# Patient Record
Sex: FEMALE | Race: WHITE | Employment: UNEMPLOYED | URBAN - NONMETROPOLITAN AREA
[De-identification: names, ages, dates, MRNs, and addresses within clinical notes are randomized per-mention and may not be internally consistent; named-entity substitution may affect disease eponyms.]

---

## 2022-09-29 ENCOUNTER — OFFICE VISIT (OUTPATIENT)
Dept: FAMILY MEDICINE CLINIC | Age: 42
End: 2022-09-29

## 2022-09-29 ENCOUNTER — OFFICE VISIT (OUTPATIENT)
Dept: ORTHOPEDIC SURGERY | Age: 42
End: 2022-09-29

## 2022-09-29 ENCOUNTER — TELEPHONE (OUTPATIENT)
Dept: ORTHOPEDIC SURGERY | Age: 42
End: 2022-09-29

## 2022-09-29 VITALS
TEMPERATURE: 98.1 F | RESPIRATION RATE: 18 BRPM | HEART RATE: 84 BPM | DIASTOLIC BLOOD PRESSURE: 86 MMHG | WEIGHT: 202 LBS | SYSTOLIC BLOOD PRESSURE: 134 MMHG | OXYGEN SATURATION: 100 %

## 2022-09-29 VITALS — WEIGHT: 202 LBS | BODY MASS INDEX: 32.47 KG/M2 | HEIGHT: 66 IN

## 2022-09-29 DIAGNOSIS — R20.2 PARESTHESIA OF RIGHT UPPER EXTREMITY: ICD-10-CM

## 2022-09-29 DIAGNOSIS — M25.531 RIGHT WRIST PAIN: ICD-10-CM

## 2022-09-29 DIAGNOSIS — G56.31 RADIAL TUNNEL SYNDROME, RIGHT: Primary | ICD-10-CM

## 2022-09-29 DIAGNOSIS — G89.29 CHRONIC PAIN OF RIGHT WRIST: Primary | ICD-10-CM

## 2022-09-29 DIAGNOSIS — M25.531 CHRONIC PAIN OF RIGHT WRIST: Primary | ICD-10-CM

## 2022-09-29 PROCEDURE — 99203 OFFICE O/P NEW LOW 30 MIN: CPT | Performed by: PHYSICIAN ASSISTANT

## 2022-09-29 PROCEDURE — 99212 OFFICE O/P EST SF 10 MIN: CPT | Performed by: EMERGENCY MEDICINE

## 2022-09-29 RX ORDER — METHYLPREDNISOLONE 4 MG/1
TABLET ORAL
Qty: 1 KIT | Refills: 0 | Status: SHIPPED | OUTPATIENT
Start: 2022-09-29

## 2022-09-29 ASSESSMENT — ENCOUNTER SYMPTOMS
EYE PAIN: 0
VOMITING: 0
ABDOMINAL DISTENTION: 0
WHEEZING: 0
SHORTNESS OF BREATH: 0
BACK PAIN: 0
EYE DISCHARGE: 0
COUGH: 0
SINUS PRESSURE: 0
DIARRHEA: 0
EYE REDNESS: 0
NAUSEA: 0
SORE THROAT: 0

## 2022-09-29 NOTE — TELEPHONE ENCOUNTER
Radiology is recommending MRI of the forearm to better assess a lucency they see in the proximal forearm. I notified pt and ordered MRI of the radius/ulna. Pt will be contacted by central scheduling to schedule.

## 2022-09-29 NOTE — PROGRESS NOTES
Chief Complaint:   Wrist Pain      History of Present Illness   HPI:  Alex Pizarro is a 43 y.o. female who presents to [facility] today for right wrist pain and RUE tingling for over 1 year; using OTC wrist splint; symptoms progressively worsening; was peeling vegetables and think things are worse as ever. She is concerned about her self pay status and has not sought medical care for this problem which is progressively worsening. Prior to Visit Medications    Not on File       Review of Systems   Review of Systems   Constitutional:  Negative for chills and fever. HENT:  Negative for ear pain, sinus pressure and sore throat. Eyes:  Negative for pain, discharge and redness. Respiratory:  Negative for cough, shortness of breath and wheezing. Cardiovascular:  Negative for chest pain. Gastrointestinal:  Negative for abdominal distention, diarrhea, nausea and vomiting. Genitourinary:  Negative for dysuria and frequency. Musculoskeletal:  Positive for arthralgias and joint swelling. Negative for back pain. Skin:  Negative for rash and wound. Neurological:  Positive for numbness. Negative for weakness and headaches. Hematological:  Negative for adenopathy. Psychiatric/Behavioral: Negative. All other systems reviewed and are negative. Patient's medical, social, and family history reviewed    Past Medical History:  has no past medical history on file. Past Surgical History:  has no past surgical history on file. Social History:  reports that she has been smoking cigarettes. She has never used smokeless tobacco.  Family History: family history is not on file. Allergies: Patient has no known allergies. Physical Exam   Vital Signs:  /86   Pulse 84   Temp 98.1 °F (36.7 °C) (Temporal)   Resp 18   Wt 202 lb (91.6 kg)   SpO2 100%    Oxygen Saturation Interpretation: Normal.    Physical Exam  Vitals and nursing note reviewed.    Constitutional:       Appearance: She is well-developed. HENT:      Head: Normocephalic and atraumatic. Right Ear: Hearing and external ear normal.      Left Ear: Hearing and external ear normal.      Nose: Nose normal.      Mouth/Throat:      Pharynx: Uvula midline. Eyes:      General: Lids are normal.      Conjunctiva/sclera: Conjunctivae normal.      Pupils: Pupils are equal, round, and reactive to light. Cardiovascular:      Rate and Rhythm: Normal rate and regular rhythm. Heart sounds: Normal heart sounds. No murmur heard. Pulmonary:      Effort: Pulmonary effort is normal.      Breath sounds: Normal breath sounds. Abdominal:      General: Bowel sounds are normal.      Palpations: Abdomen is soft. Abdomen is not rigid. Tenderness: There is no abdominal tenderness. There is no guarding or rebound. Musculoskeletal:      Right wrist: Effusion and tenderness present. Decreased range of motion. Cervical back: Normal range of motion and neck supple. Comments: Right wrist:  Borderline Tinnel's/ Positive Phalen's sign   Skin:     General: Skin is warm and dry. Findings: No abrasion or rash. Neurological:      Mental Status: She is alert and oriented to person, place, and time. GCS: GCS eye subscore is 4. GCS verbal subscore is 5. GCS motor subscore is 6. Cranial Nerves: No cranial nerve deficit. Sensory: No sensory deficit. Coordination: Coordination normal.      Gait: Gait normal.       Test Results Section   (All laboratory and radiology results have been personally reviewed by myself)  Labs:  No results found for this visit on 09/29/22. Imaging: All Radiology results interpreted by Radiologist unless otherwise noted. No results found. Assessment / Plan   Impression(s):  There are no diagnoses linked to this encounter. Discharged home. Patient condition is good    Sent to [address] today for eval and treatment.   Pt states she is thankful to keep things \"simple. \"    New Medications     New Prescriptions    No medications on file       Electronically signed by Donna Murrieta DO   DD: 9/29/22

## 2022-09-29 NOTE — PATIENT INSTRUCTIONS
*At this time I have recommended an oral steroid,  Medrol Dose Tommy . I did discuss potential side effect such as GI upset, mood changes, depression, anxiety, change in sleep habits. The patient develops any of these signs or symptoms they will call the office immediately and we will discontinue the medication in appropriate fashion. They are aware that he should not use any other oral anti-inflammatories while on the oral steroids. They can use Tylenol 500 mg with the directions to take 1 tablet by mouth every 8 hours as needed for pain.

## 2022-09-29 NOTE — PROGRESS NOTES
[address] [facility],[address] [facility] In Care  New Patient Note      CHIEF COMPLAINT:   Chief Complaint   Patient presents with    Wrist Pain     Pt presents this AM with c/o R wrist pain. States that she has noticed wrist pain over the past few years, but recently worsened after cutting and peeling potatoes this week. Tried wearing a brace and it did not help. Pain is in ulnar side of the wrist. Notes tingling in fingers occasionally, but is unsure which ones. Pain extends into her forearm, and she notes a feeling as though she is being stabbed with a needle. Has been taking ibuprofen and Tylenol for the pain. HISTORY OF PRESENT ILLNESS:                The patient is a 43 y.o. female who presents today with complaints of right wrist pain that she has been noticing for the last few years with no known mechanism of injury. She states the pain worsened this week after she was cutting and peeling potatoes. She has pain in the wrist and forearm. She does note numbness and tingling into her hand and forearm. She is unable to identify if all of the fingers are going numb or some. She also states that she has inflammation in the wrist and fingers. She reports pain is worse with gripping, and wrist range of motion or finger range of motion. She is never injured this forearm wrist or hand in the past.  She has tried at home therapies of ibuprofen and Tylenol for symptomatic relief. She is also been wearing a cocked up night splint for carpal tunnel that she bought over-the-counter. She is right-hand dominant. Past Medical History:    No past medical history on file. Past Surgical History:    No past surgical history on file. Current Medications:   No current facility-administered medications for this visit. Allergies:  Patient has no known allergies. Social History:   TOBACCO:   reports that she has been smoking cigarettes.  She has never used smokeless tobacco.  ETOH:   has no history on file for alcohol use.  DRUGS:   has no history on file for drug use. OCCUPATION:  not employed    Review of Systems   Constitutional: Negative for fever, chills, diaphoresis, appetite change and fatigue. HENT: Negative for dental issues, hearing loss and tinnitus. Negative for congestion, sinus pressure, sneezing, sore throat. Negative for headache. Eyes: Negative for visual disturbance, blurred and double vision. Negative for pain, discharge, redness and itching  Respiratory: Negative for cough, shortness of breath and wheezing. Cardiovascular: Negative for chest pain, palpitations and leg swelling. No dyspnea on exertion   Gastrointestinal:   Negative for nausea, vomiting, abdominal pain, diarrhea, constipation  or black or bloody. Hematologic\Lymphatic:  negative for bleeding, petechiae,   Genitourinary: Negative for hematuria and difficulty urinating. Musculoskeletal: Negative for neck pain and stiffness. Negative for back pain, joint swelling and gait problem. +right wrist pain  Skin: Negative for pallor, rash and wound. Neurological: Negative for dizziness, tremors, seizures, weakness, light-headedness, no TIA or stroke symptoms. +Numbness in fingers. Psychiatric/Behavioral: Negative.      Physical Examination:   General appearance: alert, well appearing, and in no distress,  normal appearing weight  Mental status: alert, oriented to person, place, and time, normal mood, behavior, speech, dress, motor activity, and thought processes  Resp:   resp easy and unlabored, no audible wheezes note  Cardiac: distal pulses palpable, skin well perfused  Neurological: alert, oriented X3, normal speech, no focal findings or movement disorder noted, motor and sensory grossly normal bilaterally, normal muscle tone  HEENT: normochephalic atraumatic, external ears and eyes normal, sclera normal, neck supple  Extremities:   peripheral pulses normal, no edema, redness or tenderness in the calves   Skin: normal coloration, no rashes or open wounds, no suspicious skin lesions noted  Psych: Affect euthymic   Musculoskeletal:   Wrist/Hand:  On visual inspection there is no obvious deformity of the right wrist or hand. There is no erythema, edema, ecchymosis or open wounds. There is no decreased sensation to light touch throughout the right wrist or hand. Patient is grossly neurovascularly intact. Right wrist/hand: Patient is tender to palpation at the at the proximal third of her radius and on the palmar aspect of the wrist, no tenderness to palpation elsewhere in the wrist or hand. Active range of motion wrist Flexion 70, Extension 20, Ulnar deviation 20, Radial deviation 20. Active range of motion of the fingers is WNL without difficulty. MMT 4/5 wrist flexion, 3+/5 wrist extension, 5/5 Ulnar deviation, 5/5 Radial deviation. Patient is able to perform thumb to finger approximation without difficulty. MMT 4/5  strength. Strong radial pulse noted. (-) Tinels at the wrist and elbow, (-) Finklesteins, (-) Phalens, (+) reproducible symptoms with compression of the radius and ulna at mid forearm. Ht 5' 6\" (1.676 m)   Wt 202 lb (91.6 kg) Comment: per patient  BMI 32.60 kg/m²      XR: 3 view right wrist x-rays were obtained in the clinic today which were negative for fracture or dislocation. 3 view right radius and ulna x-rays were obtained in the clinic today which show no evidence of fracture or dislocation. The imaging will be reviewed and interpreted by Radiologist.  The report was not complete at the time of this note. Please refer to Radiologist report for their interpretation. ASSESSMENT:   Diagnosis Orders   1. Radial tunnel syndrome, right        2.  Right wrist pain  XR WRIST RIGHT (MIN 3 VIEWS)    XR RADIUS ULNA RIGHT (2 VIEWS)    methylPREDNISolone (MEDROL, GILLIAN,) 4 MG tablet          PLAN:  Patient is a pleasant 41-year-old female who presents to the clinic today for evaluation of worsening right Otherwise, I will see the patient back on a PRN basis. Electronically signed by Sonny Donald PA-C on 9/29/22 at 10:52 AM EDT    **This report was transcribed using voice recognition software. Every effort was made to ensure accuracy; however, inadvertent computerized transcription errors may be present. **